# Patient Record
Sex: MALE | URBAN - METROPOLITAN AREA
[De-identification: names, ages, dates, MRNs, and addresses within clinical notes are randomized per-mention and may not be internally consistent; named-entity substitution may affect disease eponyms.]

---

## 2023-06-13 ENCOUNTER — HOSPITAL ENCOUNTER (OUTPATIENT)
Dept: RADIOLOGY | Facility: HOSPITAL | Age: 48
Discharge: HOME | End: 2023-06-13
Attending: NURSE PRACTITIONER
Payer: COMMERCIAL

## 2023-06-13 DIAGNOSIS — R79.89 ABNORMAL THYROID SCREEN (BLOOD): ICD-10-CM

## 2023-06-13 PROCEDURE — 76536 US THYROID: ICD-10-PCS | Mod: 26,,, | Performed by: RADIOLOGY

## 2023-06-13 PROCEDURE — 76536 US EXAM OF HEAD AND NECK: CPT | Mod: 26,,, | Performed by: RADIOLOGY

## 2023-06-13 PROCEDURE — 76536 US EXAM OF HEAD AND NECK: CPT | Mod: TC,PO

## 2024-08-14 ENCOUNTER — HOSPITAL ENCOUNTER (EMERGENCY)
Facility: HOSPITAL | Age: 49
Discharge: HOME | End: 2024-08-14
Attending: EMERGENCY MEDICINE
Payer: COMMERCIAL

## 2024-08-14 VITALS
RESPIRATION RATE: 17 BRPM | TEMPERATURE: 98 F | OXYGEN SATURATION: 97 % | BODY MASS INDEX: 38.25 KG/M2 | HEIGHT: 66 IN | DIASTOLIC BLOOD PRESSURE: 89 MMHG | HEART RATE: 77 BPM | WEIGHT: 238 LBS | SYSTOLIC BLOOD PRESSURE: 142 MMHG

## 2024-08-14 DIAGNOSIS — S76.302A HAMSTRING INJURY, LEFT, INITIAL ENCOUNTER: Primary | ICD-10-CM

## 2024-08-14 PROCEDURE — 99284 EMERGENCY DEPT VISIT MOD MDM: CPT | Mod: ER

## 2024-08-14 RX ORDER — NAPROXEN 500 MG/1
500 TABLET ORAL 2 TIMES DAILY WITH MEALS
Qty: 12 TABLET | Refills: 0 | Status: SHIPPED | OUTPATIENT
Start: 2024-08-14

## 2024-08-14 RX ORDER — METHOCARBAMOL 750 MG/1
1500 TABLET, FILM COATED ORAL 3 TIMES DAILY
Qty: 30 TABLET | Refills: 0 | Status: SHIPPED | OUTPATIENT
Start: 2024-08-14 | End: 2024-08-19

## 2024-08-14 NOTE — ED PROVIDER NOTES
History      Chief Complaint   Patient presents with    Leg Injury     When running on Monday pt felt a pinch in left posterior leg. Bruising, pain since. Decreased flexion.        Review of patient's allergies indicates:  No Known Allergies     HPI   HPI    8/14/2024, 5:41 PM   History obtained from the patient      History of Present Illness: Abhishek Richardson is a 49 y.o. male patient who presents to the Emergency Department for left posterior thigh pain and bruising since feeling a pain while running Monday.  Denies knee pain.    No further complaints or concerns at this time.           PCP: Gino Gayle, FELIX       Past Medical History:  No past medical history on file.      Past Surgical History:  No past surgical history on file.        Family History:  No family history on file.        Social History:  Social History     Tobacco Use    Smoking status: Not on file    Smokeless tobacco: Not on file   Substance and Sexual Activity    Alcohol use: Not on file    Drug use: Not on file    Sexual activity: Not on file       ROS     Review of Systems   Constitutional:  Negative for fever.   Musculoskeletal:  Positive for myalgias. Negative for arthralgias.       Physical Exam      Initial Vitals [08/14/24 1604]   BP Pulse Resp Temp SpO2   (!) 146/96 80 18 98 °F (36.7 °C) 96 %      MAP       --         Physical Exam  Vital signs and nursing notes reviewed.  Constitutional: Patient is in NAD. Awake and alert. Well-developed and well-nourished.  Head: Atraumatic. Normocephalic.  Eyes:  EOM intact. Conjunctivae nl. No scleral icterus.  ENT: Mucous membranes are moist.   Neck: Supple.  No meningismus  Cardiovascular: Regular rate and rhythm. No murmurs, rubs, or gallops.   Pulmonary/Chest: No respiratory distress. Clear to auscultation bilaterally. No wheezing, rales, or rhonchi.  Musculoskeletal: Moves all extremities.Left posterior thigh with ecchymosis.  Limited flexion of knee against gravity.   "Ambulates with slight limp.   Skin: Warm and dry.  Neurological: Awake and alert. No acute focal neurological deficits are appreciated.  Psychiatric: Normal affect. Good eye contact. Appropriate in content.      ED Course          Procedures  ED Vital Signs:  Vitals:    08/14/24 1604 08/14/24 1638   BP: (!) 146/96 (!) 142/89   Pulse: 80 77   Resp: 18 17   Temp: 98 °F (36.7 °C)    TempSrc: Oral    SpO2: 96% 97%   Weight: 108 kg (237 lb 15.8 oz)    Height: 5' 6" (1.676 m)                  Imaging Results:  Imaging Results    None            The Emergency Provider reviewed the vital signs and test results, which are outlined above.    ED Discussion             Medication(s) given in the ER:  Medications - No data to display         Follow-up Information       Clinic, [facility]. Schedule an appointment as soon as possible for a visit in 3 days.    Contact information:  [address] [place] [address] [address]  [place] [address] [phone]                                    Medication List        START taking these medications      methocarbamoL 750 MG Tab  Commonly known as: ROBAXIN  Take 2 tablets (1,500 mg total) by mouth 3 (three) times daily. for 5 days     naproxen 500 MG tablet  Commonly known as: NAPROSYN  Take 1 tablet (500 mg total) by mouth 2 (two) times daily with meals. Prn pain               Where to Get Your Medications        These medications were sent to [facility]/pharmacy #[id number] - [place], [place] - [address] [address] [place] [address]      Phone: [phone]   methocarbamoL 750 MG Tab  naproxen 500 MG tablet             Medical Decision Making        All findings were reviewed with the patient/family in detail.   All remaining questions and concerns were addressed at that time.  Patient/family has been counseled regarding the need for follow-up as well as the indication to return to the emergency room should new or worrisome developments occur.        MDM           "       Clinical Impression:        ICD-10-CM ICD-9-CM   1. Hamstring injury, left, initial encounter  S76.302A 959.6               Nga Villatoro, PA-C  08/14/24 1748